# Patient Record
Sex: MALE | Race: WHITE | URBAN - NONMETROPOLITAN AREA
[De-identification: names, ages, dates, MRNs, and addresses within clinical notes are randomized per-mention and may not be internally consistent; named-entity substitution may affect disease eponyms.]

---

## 2017-03-07 ENCOUNTER — OFFICE VISIT (OUTPATIENT)
Dept: ENDOCRINOLOGY | Age: 52
End: 2017-03-07

## 2017-03-07 VITALS
DIASTOLIC BLOOD PRESSURE: 60 MMHG | SYSTOLIC BLOOD PRESSURE: 104 MMHG | HEART RATE: 71 BPM | RESPIRATION RATE: 18 BRPM | HEIGHT: 70 IN | WEIGHT: 123 LBS | BODY MASS INDEX: 17.61 KG/M2

## 2017-03-07 DIAGNOSIS — E04.1 THYROID NODULE: Primary | ICD-10-CM

## 2017-03-07 DIAGNOSIS — E03.9 ACQUIRED HYPOTHYROIDISM: ICD-10-CM

## 2017-03-07 PROCEDURE — 99203 OFFICE O/P NEW LOW 30 MIN: CPT | Performed by: INTERNAL MEDICINE

## 2017-03-07 RX ORDER — LEVOTHYROXINE SODIUM 0.07 MG/1
TABLET ORAL
Qty: 30 TABLET | Refills: 5 | Status: SHIPPED | OUTPATIENT
Start: 2017-03-07

## 2017-03-07 RX ORDER — LEVOTHYROXINE SODIUM 0.07 MG/1
TABLET ORAL
COMMUNITY
End: 2017-03-07 | Stop reason: SDUPTHER

## 2017-06-06 ENCOUNTER — TELEPHONE (OUTPATIENT)
Dept: ENDOCRINOLOGY | Age: 52
End: 2017-06-06

## 2023-04-12 ENCOUNTER — APPOINTMENT (OUTPATIENT)
Dept: INTERNAL MEDICINE | Facility: OTHER | Age: 58
End: 2023-04-12
Payer: MEDICAID

## 2023-05-22 ENCOUNTER — APPOINTMENT (OUTPATIENT)
Dept: INTERNAL MEDICINE | Facility: OTHER | Age: 58
End: 2023-05-22
Payer: COMMERCIAL

## 2023-07-25 ENCOUNTER — HOSPITAL ENCOUNTER (EMERGENCY)
Facility: MEDICAL CENTER | Age: 58
End: 2023-07-26
Attending: EMERGENCY MEDICINE
Payer: COMMERCIAL

## 2023-07-25 VITALS
RESPIRATION RATE: 18 BRPM | BODY MASS INDEX: 15.83 KG/M2 | HEIGHT: 71 IN | TEMPERATURE: 98.6 F | HEART RATE: 82 BPM | SYSTOLIC BLOOD PRESSURE: 115 MMHG | OXYGEN SATURATION: 97 % | WEIGHT: 113.1 LBS | DIASTOLIC BLOOD PRESSURE: 75 MMHG

## 2023-07-25 DIAGNOSIS — Z76.0 MEDICATION REFILL: ICD-10-CM

## 2023-07-25 DIAGNOSIS — E03.9 HYPOTHYROIDISM, UNSPECIFIED TYPE: ICD-10-CM

## 2023-07-25 PROCEDURE — 99281 EMR DPT VST MAYX REQ PHY/QHP: CPT

## 2023-07-26 RX ORDER — LEVOTHYROXINE SODIUM 0.07 MG/1
75 TABLET ORAL
Qty: 30 TABLET | Refills: 1 | Status: SHIPPED | OUTPATIENT
Start: 2023-07-26 | End: 2023-09-24

## 2023-07-26 NOTE — ED TRIAGE NOTES
"Chief Complaint   Patient presents with    Medication Refill     Patient states need refill on levothyroxine. States PCP cant refill until 8/14, unable to refill. Patient reports that he is supposed to be taking 1 pill daily, has been cutting them in half today and yesterday.     /75   Pulse 82   Temp 37 °C (98.6 °F) (Temporal)   Resp 18   Ht 1.803 m (5' 11\")   Wt 51.3 kg (113 lb 1.5 oz)   SpO2 97%   BMI 15.77 kg/m²     "

## 2023-07-26 NOTE — ED NOTES
Patient provided discharge instructions and prescription education. Patient denies questions at this time. Patient ambulated out of ED independently with steady gait. No acute changes or concerns at this time.

## 2023-07-26 NOTE — ED PROVIDER NOTES
ED Provider Note    CHIEF COMPLAINT  Chief Complaint   Patient presents with    Medication Refill     Patient states need refill on levothyroxine. States PCP cant refill until 8/14, unable to refill. Patient reports that he is supposed to be taking 1 pill daily, has been cutting them in half today and yesterday.       EXTERNAL RECORDS REVIEWED  Other than    HPI/ROS  LIMITATION TO HISTORY   Select: : None      Carlos Alberto Resendez is a 57 y.o. male who presents for request of refill of his levothyroxine 75 mcg/day.  Patient states that he ran out of his 3-month supply prescribed by his previous provider in [place].  He does not see his new primary care provider in [place] until next month.  He has been cutting his pills in half for the past couple days.  He denies any chest pain, shortness breath, abdominal pain, nausea, vomiting.  He states he is at his baseline, however, concerned about not taking his levothyroxine full dose.    PAST MEDICAL HISTORY   has a past medical history of History of chickenpox and Thyroid disease.    SURGICAL HISTORY  patient denies any surgical history    FAMILY HISTORY  Family History   Problem Relation Age of Onset    Arthritis Mother     Dementia Mother     Heart Disease Father     Thyroid Sister        SOCIAL HISTORY  Social History     Tobacco Use    Smoking status: Never    Smokeless tobacco: Never   Vaping Use    Vaping Use: Never used   Substance and Sexual Activity    Alcohol use: Never    Drug use: Yes     Types: Marijuana     Comment: oil for pain with CBD/THC     Sexual activity: Yes     Partners: Female     Birth control/protection: Condom       CURRENT MEDICATIONS  Home Medications       Reviewed by Karen Jefferson R.N. (Registered Nurse) on 07/25/23 at 2351  Med List Status: Not Addressed     Medication Last Dose Status   levothyroxine (SYNTHROID) 75 MCG Tab  Active                    ALLERGIES  Allergies   Allergen Reactions    Pcn [Penicillins]        PHYSICAL EXAM  VITAL  "SIGNS: /75   Pulse 82   Temp 37 °C (98.6 °F) (Temporal)   Resp 18   Ht 1.803 m (5' 11\")   Wt 51.3 kg (113 lb 1.5 oz)   SpO2 97%   BMI 15.77 kg/m²    Physical Exam  Vitals and nursing note reviewed.   Constitutional:       General: He is not in acute distress.     Appearance: Normal appearance. He is normal weight.   HENT:      Head: Normocephalic and atraumatic.      Right Ear: External ear normal.      Left Ear: External ear normal.      Nose: Nose normal.   Eyes:      Extraocular Movements: Extraocular movements intact.      Conjunctiva/sclera: Conjunctivae normal.      Pupils: Pupils are equal, round, and reactive to light.   Pulmonary:      Effort: Pulmonary effort is normal.   Abdominal:      General: Abdomen is flat.   Skin:     General: Skin is warm and dry.      Findings: No rash.   Neurological:      Mental Status: He is alert and oriented to person, place, and time.         COURSE & MEDICAL DECISION MAKING    ED Observation Status? No; Patient does not meet criteria for ED Observation.     INITIAL ASSESSMENT, COURSE AND PLAN  Care Narrative: Carlos Alberto Resendez is a 57 y.o. male who presents for request of refill of his levothyroxine 75 mcg/day.  He is afebrile, vital signs reassuring.  He is requesting medication refill and has no other complaints.  He will be given his 75 mcg levothyroxine daily prescription for 30 days with 1 refill.  He will follow-up with his primary care provider on his appointment next month.  Discharged in good condition.    FINAL DIAGNOSIS  1. Medication refill Acute   2. Hypothyroidism, unspecified type       DISPOSITION:  Patient will be discharged home in stable condition.    FOLLOW UP:  LORETTA CristinaP.R.N.  [address] [facility] [address]  [place] [address] [phone]    Schedule an appointment as soon as possible for a visit       [facility], Emergency Dept  [address]  [place] [address] [phone]    As needed, If " symptoms worsen      OUTPATIENT MEDICATIONS:  Current Discharge Medication List          Electronically signed by: Marlena Reeves M.D., 7/25/2023 11:57 PM

## 2023-09-23 SDOH — ECONOMIC STABILITY: TRANSPORTATION INSECURITY
IN THE PAST 12 MONTHS, HAS LACK OF RELIABLE TRANSPORTATION KEPT YOU FROM MEDICAL APPOINTMENTS, MEETINGS, WORK OR FROM GETTING THINGS NEEDED FOR DAILY LIVING?: YES

## 2023-09-23 SDOH — ECONOMIC STABILITY: FOOD INSECURITY: WITHIN THE PAST 12 MONTHS, THE FOOD YOU BOUGHT JUST DIDN'T LAST AND YOU DIDN'T HAVE MONEY TO GET MORE.: PATIENT DECLINED

## 2023-09-23 SDOH — ECONOMIC STABILITY: INCOME INSECURITY: IN THE LAST 12 MONTHS, WAS THERE A TIME WHEN YOU WERE NOT ABLE TO PAY THE MORTGAGE OR RENT ON TIME?: YES

## 2023-09-23 SDOH — ECONOMIC STABILITY: TRANSPORTATION INSECURITY
IN THE PAST 12 MONTHS, HAS THE LACK OF TRANSPORTATION KEPT YOU FROM MEDICAL APPOINTMENTS OR FROM GETTING MEDICATIONS?: YES

## 2023-09-23 SDOH — HEALTH STABILITY: PHYSICAL HEALTH: ON AVERAGE, HOW MANY MINUTES DO YOU ENGAGE IN EXERCISE AT THIS LEVEL?: 150+ MIN

## 2023-09-23 SDOH — ECONOMIC STABILITY: FOOD INSECURITY: WITHIN THE PAST 12 MONTHS, YOU WORRIED THAT YOUR FOOD WOULD RUN OUT BEFORE YOU GOT MONEY TO BUY MORE.: SOMETIMES TRUE

## 2023-09-23 SDOH — HEALTH STABILITY: MENTAL HEALTH
STRESS IS WHEN SOMEONE FEELS TENSE, NERVOUS, ANXIOUS, OR CAN'T SLEEP AT NIGHT BECAUSE THEIR MIND IS TROUBLED. HOW STRESSED ARE YOU?: TO SOME EXTENT

## 2023-09-23 SDOH — HEALTH STABILITY: PHYSICAL HEALTH: ON AVERAGE, HOW MANY DAYS PER WEEK DO YOU ENGAGE IN MODERATE TO STRENUOUS EXERCISE (LIKE A BRISK WALK)?: 7 DAYS

## 2023-09-23 SDOH — ECONOMIC STABILITY: HOUSING INSECURITY: IN THE LAST 12 MONTHS, HOW MANY PLACES HAVE YOU LIVED?: 2

## 2023-09-23 SDOH — ECONOMIC STABILITY: HOUSING INSECURITY
IN THE LAST 12 MONTHS, WAS THERE A TIME WHEN YOU DID NOT HAVE A STEADY PLACE TO SLEEP OR SLEPT IN A SHELTER (INCLUDING NOW)?: NO

## 2023-09-23 SDOH — ECONOMIC STABILITY: INCOME INSECURITY: HOW HARD IS IT FOR YOU TO PAY FOR THE VERY BASICS LIKE FOOD, HOUSING, MEDICAL CARE, AND HEATING?: HARD

## 2023-09-23 SDOH — ECONOMIC STABILITY: TRANSPORTATION INSECURITY
IN THE PAST 12 MONTHS, HAS LACK OF TRANSPORTATION KEPT YOU FROM MEETINGS, WORK, OR FROM GETTING THINGS NEEDED FOR DAILY LIVING?: YES

## 2023-09-23 SDOH — ECONOMIC STABILITY: HOUSING INSECURITY
IN THE LAST 12 MONTHS, WAS THERE A TIME WHEN YOU DID NOT HAVE A STEADY PLACE TO SLEEP OR SLEPT IN A SHELTER (INCLUDING NOW)?: YES

## 2023-09-23 ASSESSMENT — SOCIAL DETERMINANTS OF HEALTH (SDOH)
HOW OFTEN DO YOU HAVE SIX OR MORE DRINKS ON ONE OCCASION: NEVER
IN A TYPICAL WEEK, HOW MANY TIMES DO YOU TALK ON THE PHONE WITH FAMILY, FRIENDS, OR NEIGHBORS?: MORE THAN THREE TIMES A WEEK
HOW OFTEN DO YOU ATTENT MEETINGS OF THE CLUB OR ORGANIZATION YOU BELONG TO?: NEVER
WITHIN THE PAST 12 MONTHS, YOU WORRIED THAT YOUR FOOD WOULD RUN OUT BEFORE YOU GOT THE MONEY TO BUY MORE: SOMETIMES TRUE
HOW OFTEN DO YOU ATTEND CHURCH OR RELIGIOUS SERVICES?: NEVER
HOW OFTEN DO YOU GET TOGETHER WITH FRIENDS OR RELATIVES?: THREE TIMES A WEEK
HOW OFTEN DO YOU HAVE A DRINK CONTAINING ALCOHOL: NEVER
HOW MANY DRINKS CONTAINING ALCOHOL DO YOU HAVE ON A TYPICAL DAY WHEN YOU ARE DRINKING: PATIENT DOES NOT DRINK
DO YOU BELONG TO ANY CLUBS OR ORGANIZATIONS SUCH AS CHURCH GROUPS UNIONS, FRATERNAL OR ATHLETIC GROUPS, OR SCHOOL GROUPS?: NO
IN A TYPICAL WEEK, HOW MANY TIMES DO YOU TALK ON THE PHONE WITH FAMILY, FRIENDS, OR NEIGHBORS?: MORE THAN THREE TIMES A WEEK
HOW OFTEN DO YOU ATTEND CHURCH OR RELIGIOUS SERVICES?: NEVER
HOW HARD IS IT FOR YOU TO PAY FOR THE VERY BASICS LIKE FOOD, HOUSING, MEDICAL CARE, AND HEATING?: HARD
HOW OFTEN DO YOU GET TOGETHER WITH FRIENDS OR RELATIVES?: THREE TIMES A WEEK
DO YOU BELONG TO ANY CLUBS OR ORGANIZATIONS SUCH AS CHURCH GROUPS UNIONS, FRATERNAL OR ATHLETIC GROUPS, OR SCHOOL GROUPS?: NO
HOW OFTEN DO YOU ATTENT MEETINGS OF THE CLUB OR ORGANIZATION YOU BELONG TO?: NEVER

## 2023-09-23 ASSESSMENT — LIFESTYLE VARIABLES
SKIP TO QUESTIONS 9-10: 1
HOW OFTEN DO YOU HAVE SIX OR MORE DRINKS ON ONE OCCASION: NEVER
HOW OFTEN DO YOU HAVE A DRINK CONTAINING ALCOHOL: NEVER
HOW MANY STANDARD DRINKS CONTAINING ALCOHOL DO YOU HAVE ON A TYPICAL DAY: PATIENT DOES NOT DRINK
AUDIT-C TOTAL SCORE: 0

## 2023-09-25 ENCOUNTER — OFFICE VISIT (OUTPATIENT)
Dept: INTERNAL MEDICINE | Facility: OTHER | Age: 58
End: 2023-09-25
Payer: COMMERCIAL

## 2023-09-25 VITALS
WEIGHT: 115 LBS | HEART RATE: 76 BPM | SYSTOLIC BLOOD PRESSURE: 118 MMHG | TEMPERATURE: 97 F | BODY MASS INDEX: 17.43 KG/M2 | HEIGHT: 68 IN | DIASTOLIC BLOOD PRESSURE: 67 MMHG | OXYGEN SATURATION: 94 %

## 2023-09-25 DIAGNOSIS — R63.6 UNDERWEIGHT DUE TO INADEQUATE CALORIC INTAKE: ICD-10-CM

## 2023-09-25 DIAGNOSIS — Z74.8 CAREGIVER HAS DIFFICULTY PERFORMING CARETAKING: ICD-10-CM

## 2023-09-25 DIAGNOSIS — E03.9 HYPOTHYROIDISM, UNSPECIFIED TYPE: ICD-10-CM

## 2023-09-25 PROCEDURE — 3078F DIAST BP <80 MM HG: CPT | Mod: GC

## 2023-09-25 PROCEDURE — 99204 OFFICE O/P NEW MOD 45 MIN: CPT | Mod: GC

## 2023-09-25 PROCEDURE — 3074F SYST BP LT 130 MM HG: CPT | Mod: GC

## 2023-09-25 RX ORDER — LEVOTHYROXINE SODIUM 0.07 MG/1
75 TABLET ORAL
Qty: 90 TABLET | Refills: 0 | Status: SHIPPED | OUTPATIENT
Start: 2023-09-25 | End: 2023-12-20

## 2023-09-25 RX ORDER — LEVOTHYROXINE SODIUM 0.07 MG/1
75 TABLET ORAL
COMMUNITY
End: 2023-09-25 | Stop reason: SDUPTHER

## 2023-09-25 ASSESSMENT — ENCOUNTER SYMPTOMS
DIARRHEA: 0
BLURRED VISION: 0
INSOMNIA: 0
HEADACHES: 0
FEVER: 0
ABDOMINAL PAIN: 0
SHORTNESS OF BREATH: 0
DEPRESSION: 0
SORE THROAT: 0
HEMOPTYSIS: 0
CHILLS: 0
DOUBLE VISION: 0
COUGH: 0
VOMITING: 0
DIZZINESS: 0
ORTHOPNEA: 0

## 2023-09-25 ASSESSMENT — PATIENT HEALTH QUESTIONNAIRE - PHQ9: CLINICAL INTERPRETATION OF PHQ2 SCORE: 0

## 2023-09-25 NOTE — PROGRESS NOTES
Teaching Physician Attestation      Level of Participation    I have personally interviewed and examined the patient.  In addition, I discussed with the resident physician the patient's history, exam, assessment and plan in detail.  Topics listed in my addendum were the focus of the visit.  Healthcare maintenance was not addressed this visit unless listed as a topic in my addendum.  I agree with the plan as written along with the following additions/modifications:    New Pt    PMH: hypothyroidism, caregiver fatigue        hypothyroidism  -No symptoms, no clear thyromegaly on exam.  Check TSH for monitoring, continue Synthroid 75 mcg daily.      Low bmi likely secondary to caregiver stress/fatigue  -Patient clearly identifies that caring for his mother with dementia preoccupy is much of his time, generate stress, and is the primary reason he is not eating.  Emphasized the importance of taking care of himself so he can better take care of others.  No SI reported to resident.  Anxiety resources handout given, also referral to therapy and [facility], appreciate support.  Set goal to gain at least a few pounds between now and our next visit in 6 weeks    Return to clinic in 5 to 6 weeks

## 2023-09-25 NOTE — PATIENT INSTRUCTIONS
Today we discussed your your issues regarding  Please follow up for your labs before your next visit.  Thank you for visiting me today at the [facility].   Please follow up with me in 6 weeks.

## 2023-12-20 DIAGNOSIS — E03.9 HYPOTHYROIDISM, UNSPECIFIED TYPE: ICD-10-CM

## 2023-12-20 RX ORDER — LEVOTHYROXINE SODIUM 0.07 MG/1
TABLET ORAL
Qty: 90 TABLET | Refills: 0 | Status: SHIPPED | OUTPATIENT
Start: 2023-12-20

## 2023-12-20 NOTE — TELEPHONE ENCOUNTER
Received request via: Pharmacy    Was the patient seen in the last year in this department? Yes    Does the patient have an active prescription (recently filled or refills available) for medication(s) requested?  yes    Does the patient have long term Plus and need 100 day supply (blood pressure, diabetes and cholesterol meds only)? Patient does not have SCP

## 2024-02-05 NOTE — PROGRESS NOTES
Chief Complaint   Patient presents with    Follow-Up    Requesting Labs       HISTORY OF PRESENT ILLNESS: Patient is a 57 y.o. male established patient who presents today for the following.      Patient is 57 year old male with a pmh of hypothyroidism who presents today to establish care and is requesting refill on his thyroid medication.  Patient states he has lost weight due to recently increasing responsibilities, states he has been taking care of his mother who has dementia for the past 6 months.  Patient states he has not been having time to eat and has been feeling increasingly anxious.  Patient denies any suicidal, homicidal ideations, denies any feelings of down or depression.  Patient states he has been compliant with his thyroid medications, denies any symptoms of feeling hot or cold, denies any palpitations, chest pain, abdominal pain, constipation, diarrhea, weakness.  Patient states he is up-to-date on his colonoscopy, denies any alcohol or drug use or smoking.      Past Medical History:   Diagnosis Date    History of chickenpox     Thyroid disease        There are no problems to display for this patient.      Allergies: Pcn [penicillins]    Current Outpatient Medications   Medication Sig Dispense Refill    levothyroxine (SYNTHROID) 75 MCG Tab Take 1 Tablet by mouth every morning on an empty stomach for 90 days. 90 Tablet 0     No current facility-administered medications for this visit.       Social History     Tobacco Use    Smoking status: Never    Smokeless tobacco: Never   Vaping Use    Vaping Use: Never used   Substance Use Topics    Alcohol use: Never    Drug use: Yes     Types: Marijuana     Comment: oil for pain with CBD/THC        Family History   Problem Relation Age of Onset    Arthritis Mother     Dementia Mother     Heart Disease Father     Thyroid Sister          Review of Systems   Review of Systems   Constitutional:  Negative for chills and fever.   HENT:  Negative for sore throat.   "  Eyes:  Negative for blurred vision and double vision.   Respiratory:  Negative for cough, hemoptysis and shortness of breath.    Cardiovascular:  Negative for chest pain and orthopnea.   Gastrointestinal:  Negative for abdominal pain, diarrhea and vomiting.   Genitourinary:  Negative for dysuria and urgency.   Neurological:  Negative for dizziness and headaches.   Psychiatric/Behavioral:  Negative for depression. The patient does not have insomnia.        Exam:  /67 (BP Location: Left arm, Patient Position: Sitting, BP Cuff Size: Large adult)   Pulse 76   Temp 36.1 °C (97 °F) (Temporal)   Ht 1.727 m (5' 8\")   Wt 52.2 kg (115 lb)   SpO2 94%  Body mass index is 17.49 kg/m².    Constitutional:  Not in acute distress, well appearing.  HEENT:   NC/AT, no thyromegaly appreciated  Cardiovascular: Regular rate and rhythm. No murmurs or gallops.      Lungs:   Clear to auscultation bilaterally. No wheezes or crackles. No respiratory distress.  Abdomen: Not distended, soft, not tender. No guarding or rigidity. No masses.  Extremities:  No cyanosis/clubbing/edema. No obvious deformities.  Skin:  Warm and dry.  No visible rashes.  Neurologic: Alert & oriented x 3, CN II-XII grossly intact, strength and sensation grossly intact.  No focal deficits noted.  Psychiatric:  Affect normal, mood normal, judgment normal.    Assessment/Plan:     1. Caregiver has difficulty performing caretaking  Patient states she has been having increasing difficulty of taking care of his mom who has dementia.  Patient states he would be interested in receiving therapy.  Denies any suicidal ideation, denies any feelings of down or depression  - Referral to Behavioral Health  -[facility] TO [facility]    2. Hypothyroidism, unspecified type  Denies any symptoms of hypo or hyper thyroidism at this time, recent TSH and T4 from 2020 we will repeat levels and titrate medications as needed  - TSH+FREE T4  - levothyroxine (SYNTHROID) 75 MCG Tab; " Take 1 Tablet by mouth every morning on an empty stomach for 90 days.  Dispense: 90 Tablet; Refill: 0    3. Underweight due to inadequate caloric intake  Likely related to his increasing demand of being a caregiver, encourage patient to set goals of gaining a few pounds before next visit.  Will evaluate patient at next visit to see if he does gain weight.      All imaging results and lab results and consult notes are reviewed at this visit.  Followup: Return in about 6 weeks (around 11/6/2023).    Please note that this dictation was created using voice recognition software. I have made every reasonable attempt to correct obvious errors, but I expect that there are errors of grammar and possibly content that I did not discover before finalizing the note.    Irma Chapa, DO  PGY-2  Internal Medicine    patient

## 2024-10-21 DIAGNOSIS — E03.9 HYPOTHYROIDISM, UNSPECIFIED TYPE: ICD-10-CM

## 2024-10-23 RX ORDER — LEVOTHYROXINE SODIUM 75 UG/1
75 TABLET ORAL
Qty: 90 TABLET | Refills: 0 | Status: SHIPPED | OUTPATIENT
Start: 2024-10-23